# Patient Record
Sex: FEMALE | Race: WHITE | NOT HISPANIC OR LATINO | URBAN - METROPOLITAN AREA
[De-identification: names, ages, dates, MRNs, and addresses within clinical notes are randomized per-mention and may not be internally consistent; named-entity substitution may affect disease eponyms.]

---

## 2017-05-09 ENCOUNTER — IMPORTED ENCOUNTER (OUTPATIENT)
Dept: URBAN - METROPOLITAN AREA CLINIC 50 | Facility: CLINIC | Age: 77
End: 2017-05-09

## 2017-05-16 ENCOUNTER — IMPORTED ENCOUNTER (OUTPATIENT)
Dept: URBAN - METROPOLITAN AREA CLINIC 50 | Facility: CLINIC | Age: 77
End: 2017-05-16

## 2017-11-14 ENCOUNTER — IMPORTED ENCOUNTER (OUTPATIENT)
Dept: URBAN - METROPOLITAN AREA CLINIC 50 | Facility: CLINIC | Age: 77
End: 2017-11-14

## 2018-05-17 ENCOUNTER — IMPORTED ENCOUNTER (OUTPATIENT)
Dept: URBAN - METROPOLITAN AREA CLINIC 50 | Facility: CLINIC | Age: 78
End: 2018-05-17

## 2018-05-17 NOTE — PATIENT DISCUSSION
""""" ""Informed patient that their cataract OS > OD is visually significant and meets the criteria for cataract surgery to increase their vision and decrease their glare symptoms.  Order scans for IOL measurements

## 2019-01-29 ENCOUNTER — IMPORTED ENCOUNTER (OUTPATIENT)
Dept: URBAN - METROPOLITAN AREA CLINIC 50 | Facility: CLINIC | Age: 79
End: 2019-01-29

## 2019-03-12 NOTE — PATIENT DISCUSSION
RETINA IS ATTACHED OU: S/P PRP LASER OD; SEVERE VITREOUS HEMORRHAGE OS; NO HOLES OR TEARS SEEN ON DILATED EXAM TODAY.  RETINAL DETACHMENT SIGNS AND SYMPTOMS REVIEWED

## 2019-03-12 NOTE — PATIENT DISCUSSION
PROLIFERATIVE DIABETIC RETINOPATHY, OU: ALL TX OPTIONS DISCUSSED FOR VITREOUS HEMORRHAGE OS. WILL REQUIRE MEDICAL CLEARANCE FROM DR GLASGOW FOR PPV SURGERY WITH MAC ANESTHESIA. POOR VIEW FOR PRP LASER. PATIENT ELECTED TO START ANTI-VEGF THERAPY TODAY. RETURN AS SCHEDULED.

## 2019-04-09 NOTE — PATIENT DISCUSSION
"""Discussed he qualifies for cataract surgery. Will monitor. He isn't too bothered at this time.  """

## 2019-04-09 NOTE — PATIENT DISCUSSION
"""Discussed. His optic nerve OD looks larger today. Recommend he do HVF/ OCT and pach.  Dr. CONWAY [facility]

## 2019-05-24 NOTE — PATIENT DISCUSSION
PROLIFERATIVE DIABETIC RETINOPATHY, OS:  AVASTIN (1.25MG) INTRAVITREAL INJECTION TODAY. RETURN AS SCHEDULED FOR PRP LASER OS.

## 2019-05-24 NOTE — PATIENT DISCUSSION
RETINA IS ATTACHED OU: S/P PRP LASER OD; VITREOUS HEMORRHAGE IMPROVING OS; NO HOLES OR TEARS SEEN ON DILATED EXAM TODAY.  RETINAL DETACHMENT SIGNS AND SYMPTOMS REVIEWED

## 2019-06-25 NOTE — PATIENT DISCUSSION
New Prescription: Pred Forte (prednisolone acetate): drops,suspension: 1% 1 drop four times a day into affected eye 06-

## 2019-06-25 NOTE — PATIENT DISCUSSION
DIABETIC RETINOPATHY: PT IS BEING FOLLOWED BY DR. Rigo Chaidez. KEEP SCHEDULED APPTS.  HAS BEEN CLEARED FOR PHACO OS

## 2019-06-25 NOTE — PATIENT DISCUSSION
New Prescription: Ocuflox (ofloxacin): drops: 0.3% 1 drop four times a day as directed into affected eye 06-

## 2019-06-25 NOTE — PATIENT DISCUSSION
New Prescription: Acular (ketorolac): drops: 0.5% 1 drop four times a day as directed into affected eye 06-

## 2019-07-11 NOTE — PATIENT DISCUSSION
Continue: Pred Forte (prednisolone acetate): drops,suspension: 1% 1 drop four times a day into affected eye 06-

## 2019-07-23 ENCOUNTER — IMPORTED ENCOUNTER (OUTPATIENT)
Dept: URBAN - METROPOLITAN AREA CLINIC 50 | Facility: CLINIC | Age: 79
End: 2019-07-23

## 2019-07-23 NOTE — PATIENT DISCUSSION
""" to review testing with patient today."" ""Increase Timolol 0.5% both eyes twice a day . """ normal

## 2019-07-24 NOTE — PATIENT DISCUSSION
POST-OP INSTRUCTIONS:  The patient is told to resume normal daily activities. New spectacle prescripton given to patient. Patient to stop Oculfloxacin. Continue Acular QID OS until bottle runs out. Patient to taper prednisolone with a 3,2,1 taper and then discontinue. Will see patient back in 3 months for a DFE. Monitor.

## 2019-08-08 ENCOUNTER — IMPORTED ENCOUNTER (OUTPATIENT)
Dept: URBAN - METROPOLITAN AREA CLINIC 50 | Facility: CLINIC | Age: 79
End: 2019-08-08

## 2019-08-08 NOTE — PATIENT DISCUSSION
"""Reassured patient that intraocular pressures (IOPs) are at target levels and other ocular findings are stable. Continue present management and/or medication(s). Follow up as directed. "" ""Continue Timolol 0.5% both eyes twice a day . """

## 2020-02-06 ENCOUNTER — IMPORTED ENCOUNTER (OUTPATIENT)
Dept: URBAN - METROPOLITAN AREA CLINIC 50 | Facility: CLINIC | Age: 80
End: 2020-02-06

## 2020-08-13 ENCOUNTER — IMPORTED ENCOUNTER (OUTPATIENT)
Dept: URBAN - METROPOLITAN AREA CLINIC 50 | Facility: CLINIC | Age: 80
End: 2020-08-13

## 2020-08-20 ENCOUNTER — IMPORTED ENCOUNTER (OUTPATIENT)
Dept: URBAN - METROPOLITAN AREA CLINIC 50 | Facility: CLINIC | Age: 80
End: 2020-08-20

## 2020-10-21 ENCOUNTER — IMPORTED ENCOUNTER (OUTPATIENT)
Dept: URBAN - METROPOLITAN AREA CLINIC 50 | Facility: CLINIC | Age: 80
End: 2020-10-21

## 2020-11-18 ENCOUNTER — IMPORTED ENCOUNTER (OUTPATIENT)
Dept: URBAN - METROPOLITAN AREA CLINIC 50 | Facility: CLINIC | Age: 80
End: 2020-11-18

## 2020-11-19 ENCOUNTER — IMPORTED ENCOUNTER (OUTPATIENT)
Dept: URBAN - METROPOLITAN AREA CLINIC 50 | Facility: CLINIC | Age: 80
End: 2020-11-19

## 2021-04-16 ASSESSMENT — VISUAL ACUITY
OS_CC: 20/40-2
OS_CC: 20/30-1
OD_CC: 20/25
OS_CC: 20/40-1
OD_CC: 20/20
OS_CC: 20/40
OD_OTHER: 20/50. 20/80.
OS_CC: 20/30
OD_CC: J2@ 14 IN
OS_BAT: 20/40
OD_BAT: 20/50
OD_OTHER: 20/40. 20/50.
OD_CC: J1@ 16 IN
OS_PH: 20/30
OS_CC: 20/30-
OS_BAT: 20/60
OD_CC: 20/30
OD_OTHER: 20/50. 20/60.
OS_PH: 20/40
OD_CC: 20/30+2
OS_CC: 20/30
OS_CC: J1
OS_CC: 20/30-
OS_CC: 20/30-1
OD_CC: J1
OS_OTHER: 20/60. 20/200.
OS_CC: J1
OD_CC: 20/20-1
OD_CC: 20/30
OS_PH: 20/30
OS_OTHER: 20/70. 20/80.
OD_CC: 20/25
OD_CC: 20/25
OS_CC: J2@ 14 IN
OS_CC: 20/30-1
OS_CC: 20/50
OD_BAT: 20/40
OD_CC: J1
OS_OTHER: 20/40. 20/70.
OS_BAT: 20/70
OD_BAT: 20/50
OS_PH: 20/40
OD_CC: 20/25
OS_BAT: 20/50
OS_CC: J1@ 16 IN
OS_PH: 20/30
OD_BAT: 20/50
OS_OTHER: 20/50. 20/80.
OD_CC: 20/25
OD_CC: 20/20-1

## 2021-04-16 ASSESSMENT — PACHYMETRY
OS_CT_UM: 560
OD_CT_UM: 563
OD_CT_UM: 563
OS_CT_UM: 560
OD_CT_UM: 563
OS_CT_UM: 560
OD_CT_UM: 563
OS_CT_UM: 560
OD_CT_UM: 563
OS_CT_UM: 560

## 2021-04-16 ASSESSMENT — TONOMETRY
OD_IOP_MMHG: 20
OS_IOP_MMHG: 17
OD_IOP_MMHG: 18
OS_IOP_MMHG: 21
OD_IOP_MMHG: 25
OS_IOP_MMHG: 18
OS_IOP_MMHG: 19
OS_IOP_MMHG: 20
OS_IOP_MMHG: 20
OS_IOP_MMHG: 17
OD_IOP_MMHG: 20
OD_IOP_MMHG: 22
OD_IOP_MMHG: 20
OD_IOP_MMHG: 20
OD_IOP_MMHG: 26
OS_IOP_MMHG: 21
OD_IOP_MMHG: 19
OS_IOP_MMHG: 20
OD_IOP_MMHG: 20
OD_IOP_MMHG: 21
OD_IOP_MMHG: 19
OD_IOP_MMHG: 18
OS_IOP_MMHG: 25
OS_IOP_MMHG: 21
OS_IOP_MMHG: 26
OS_IOP_MMHG: 16
OD_IOP_MMHG: 24
OS_IOP_MMHG: 27
OD_IOP_MMHG: 23
OD_IOP_MMHG: 23
OS_IOP_MMHG: 22
OD_IOP_MMHG: 18
OS_IOP_MMHG: 17
OS_IOP_MMHG: 23

## 2021-05-05 ENCOUNTER — ROUTINE EXAM (OUTPATIENT)
Dept: URBAN - METROPOLITAN AREA CLINIC 49 | Facility: CLINIC | Age: 81
End: 2021-05-05

## 2021-05-05 DIAGNOSIS — Z01.00: ICD-10-CM

## 2021-05-05 PROCEDURE — 92015 DETERMINE REFRACTIVE STATE: CPT

## 2021-05-05 PROCEDURE — 92014 COMPRE OPH EXAM EST PT 1/>: CPT

## 2021-05-05 RX ORDER — LATANOPROST 50 UG/ML
1 SOLUTION/ DROPS OPHTHALMIC EVERY EVENING
Start: 2021-05-05

## 2021-05-05 ASSESSMENT — VISUAL ACUITY
OD_CC: 20/20-1
OS_PH: 20/30
OU_CC: J1
OS_CC: 20/40-2

## 2021-05-05 ASSESSMENT — TONOMETRY
OD_IOP_MMHG: 21
OD_IOP_MMHG: 20
OS_IOP_MMHG: 20
OS_IOP_MMHG: 19

## 2021-05-05 NOTE — PATIENT DISCUSSION
Will start additional drop therapy to lower IOP OU. Target IOP will be 15 OU. Schedule 1 month IOP check.

## 2021-05-10 NOTE — PATIENT DISCUSSION
DRY EYES : Discussed with patient the importance of keeping the eye moist and the symptoms associated with dry eyes including blurry vision, tearing, burning, and enrique sensation. Advised patient to minimize use of any fans blowing directly on the face. Advised patient to continue with artificial tears 2-3 times daily.

## 2021-05-10 NOTE — PATIENT DISCUSSION
Nevus: Will keep eye to see if any new changes in size or color. Call office if any changes in vision.

## 2021-06-02 ENCOUNTER — 1 MONTH FOLLOW-UP (OUTPATIENT)
Dept: URBAN - METROPOLITAN AREA CLINIC 49 | Facility: CLINIC | Age: 81
End: 2021-06-02

## 2021-06-02 DIAGNOSIS — H40.1131: ICD-10-CM

## 2021-06-02 PROCEDURE — 92012 INTRM OPH EXAM EST PATIENT: CPT

## 2021-06-02 ASSESSMENT — VISUAL ACUITY
OS_CC: 20/30-1
OD_CC: 20/20-2

## 2021-06-02 ASSESSMENT — TONOMETRY
OD_IOP_MMHG: 18
OD_IOP_MMHG: 17
OS_IOP_MMHG: 17
OS_IOP_MMHG: 16

## 2021-06-02 NOTE — PATIENT DISCUSSION
iop at goal today,  recommend patient continue timolol twice a day in both eyes and latanoprost at bed time in both eyes.

## 2021-11-02 ENCOUNTER — 5 MONTH FOLLOW-UP (OUTPATIENT)
Dept: URBAN - METROPOLITAN AREA CLINIC 53 | Facility: CLINIC | Age: 81
End: 2021-11-02

## 2021-11-02 DIAGNOSIS — H40.013: ICD-10-CM

## 2021-11-02 DIAGNOSIS — H40.1131: ICD-10-CM

## 2021-11-02 PROCEDURE — 92014 COMPRE OPH EXAM EST PT 1/>: CPT

## 2021-11-02 PROCEDURE — 92083 EXTENDED VISUAL FIELD XM: CPT

## 2021-11-02 PROCEDURE — 92133 CPTRZD OPH DX IMG PST SGM ON: CPT

## 2021-11-02 ASSESSMENT — TONOMETRY
OD_IOP_MMHG: 17
OS_IOP_MMHG: 16
OD_IOP_MMHG: 18
OS_IOP_MMHG: 17

## 2021-11-02 ASSESSMENT — VISUAL ACUITY
OS_CC: J1+
OS_CC: 20/25
OD_CC: 20/20
OD_CC: J1+

## 2021-11-02 NOTE — PATIENT DISCUSSION
iop at goal today,  hvf/oct rnfl sdone today and reviewed with patient.  recommend patient continue timolol twice a day in both eyes and latanoprost at bed time in both eyes.

## 2021-11-02 NOTE — PATIENT DISCUSSION
Recommend artificial tears as needed.  Patient is only doing artificial tears every once in a while.  discussed with patient that she needs to increase artifial tears. recommend patient start refresh cayla 3, 3 times a day in both eyes and gel tears at bedtime.

## 2021-12-09 ENCOUNTER — FOLLOW UP (OUTPATIENT)
Dept: URBAN - METROPOLITAN AREA CLINIC 49 | Facility: CLINIC | Age: 81
End: 2021-12-09

## 2021-12-09 DIAGNOSIS — H40.1131: ICD-10-CM

## 2021-12-09 DIAGNOSIS — H40.013: ICD-10-CM

## 2021-12-09 DIAGNOSIS — H04.123: ICD-10-CM

## 2021-12-09 DIAGNOSIS — H25.813: ICD-10-CM

## 2021-12-09 PROCEDURE — 92012 INTRM OPH EXAM EST PATIENT: CPT

## 2021-12-09 ASSESSMENT — TONOMETRY
OS_IOP_MMHG: 20
OD_IOP_MMHG: 20
OS_IOP_MMHG: 19
OD_IOP_MMHG: 19

## 2021-12-09 ASSESSMENT — VISUAL ACUITY
OS_CC: 20/30
OD_CC: 20/25

## 2021-12-09 NOTE — PATIENT DISCUSSION
Will stop Gel AT's due to not tolerating it well. Continue PF AT's OU TID. Recommend switching brands and trying [place] PF AT's.  Start Tyrvaya nasal spray BID. (sample given) Advised of sneezing. Follow up in 2 weeks.

## 2021-12-09 NOTE — PATIENT DISCUSSION
Patent states many years ago she had a procedure that involved needles in her eyes. Possible corneal recurrent treatment.

## 2021-12-23 ENCOUNTER — FOLLOW UP (OUTPATIENT)
Dept: URBAN - METROPOLITAN AREA CLINIC 49 | Facility: CLINIC | Age: 81
End: 2021-12-23

## 2021-12-23 DIAGNOSIS — H40.1131: ICD-10-CM

## 2021-12-23 DIAGNOSIS — H04.123: ICD-10-CM

## 2021-12-23 DIAGNOSIS — H40.013: ICD-10-CM

## 2021-12-23 PROCEDURE — 92012 INTRM OPH EXAM EST PATIENT: CPT

## 2021-12-23 ASSESSMENT — VISUAL ACUITY
OD_CC: 20/25-2
OS_PH: 20/30
OS_CC: 20/50+2

## 2021-12-23 ASSESSMENT — TONOMETRY
OS_IOP_MMHG: 16
OD_IOP_MMHG: 16
OS_IOP_MMHG: 17
OD_IOP_MMHG: 17

## 2022-05-03 ENCOUNTER — FOLLOW UP (OUTPATIENT)
Dept: URBAN - METROPOLITAN AREA CLINIC 53 | Facility: CLINIC | Age: 82
End: 2022-05-03

## 2022-05-03 DIAGNOSIS — H40.1131: ICD-10-CM

## 2022-05-03 DIAGNOSIS — H04.123: ICD-10-CM

## 2022-05-03 PROCEDURE — 92012 INTRM OPH EXAM EST PATIENT: CPT

## 2022-05-03 ASSESSMENT — TONOMETRY
OD_IOP_MMHG: 18
OS_IOP_MMHG: 17
OS_IOP_MMHG: 16
OD_IOP_MMHG: 17

## 2022-05-03 ASSESSMENT — VISUAL ACUITY
OS_CC: 20/50-1
OD_CC: 20/25-1
OS_PH: 20/40-2

## 2022-05-04 NOTE — PATIENT DISCUSSION
-- DO NOT REPLY / DO NOT REPLY ALL --  -- Message is from the [facility]--    General Patient Message      Reason for Call: Nurse Marcy is calling on behalf of [employer] For Dr. Eduin Petersen regarding patient who health may be at risk due to continual  medication non adherence . Ref#[id number]. Please call Marcy.    Caller Information       Type Contact Phone    05/04/2022 01:50 PM CDT Phone (Incoming) Marcy [phone]     nurse calling zanda          Alternative phone number: none    Turnaround time given to caller:   \"This message will be sent to [state Provider's name]. The clinical team will fulfill your request as soon as they review your message.\"     Posterior Capsular Fibrosis Counseling: The diagnosis of posterior capsular fibrosis (PCF), also referred to as a secondary cataract or posterior capsular opacification (PCO), was discussed with the patient. The patient understands that their symptoms and limitations are likely related to this condition.

## 2022-10-18 ENCOUNTER — ESTABLISHED PATIENT (OUTPATIENT)
Dept: URBAN - METROPOLITAN AREA CLINIC 53 | Facility: CLINIC | Age: 82
End: 2022-10-18

## 2022-10-18 DIAGNOSIS — H40.013: ICD-10-CM

## 2022-10-18 DIAGNOSIS — H04.123: ICD-10-CM

## 2022-10-18 DIAGNOSIS — H25.813: ICD-10-CM

## 2022-10-18 DIAGNOSIS — H40.1131: ICD-10-CM

## 2022-10-18 PROCEDURE — 92014 COMPRE OPH EXAM EST PT 1/>: CPT

## 2022-10-18 PROCEDURE — 92083 EXTENDED VISUAL FIELD XM: CPT

## 2022-10-18 PROCEDURE — 92133 CPTRZD OPH DX IMG PST SGM ON: CPT

## 2022-10-18 ASSESSMENT — VISUAL ACUITY
OD_CC: 20/20
OS_CC: 20/40
OS_PH: 20/30

## 2022-10-18 ASSESSMENT — TONOMETRY
OS_IOP_MMHG: 21
OD_IOP_MMHG: 22
OS_IOP_MMHG: 22
OD_IOP_MMHG: 21

## 2023-01-03 NOTE — PATIENT DISCUSSION
Proliferative Diabetic Retinopathy Counseling: The patient was informed that ongoing control of blood glucose, blood pressure and lipid levels is necessary to minimize future retinal damage due to diabetes. Drysol Pregnancy And Lactation Text: This medication is considered safe during pregnancy and breast feeding.

## 2023-04-18 ENCOUNTER — FOLLOW UP (OUTPATIENT)
Dept: URBAN - METROPOLITAN AREA CLINIC 53 | Facility: CLINIC | Age: 83
End: 2023-04-18

## 2023-04-18 DIAGNOSIS — H25.813: ICD-10-CM

## 2023-04-18 DIAGNOSIS — H40.1131: ICD-10-CM

## 2023-04-18 DIAGNOSIS — H04.123: ICD-10-CM

## 2023-04-18 PROCEDURE — 92014 COMPRE OPH EXAM EST PT 1/>: CPT

## 2023-04-18 ASSESSMENT — VISUAL ACUITY
OD_GLARE: 20/25
OS_CC: 20/50-2
OD_GLARE: 20/25
OS_PH: 20/30
OS_GLARE: 20/60
OS_GLARE: 20/80
OD_CC: 20/25
OU_CC: 20/25

## 2023-04-18 ASSESSMENT — TONOMETRY
OD_IOP_MMHG: 18
OS_IOP_MMHG: 17
OS_IOP_MMHG: 16
OD_IOP_MMHG: 17

## 2023-05-02 ENCOUNTER — PRE-OP/H&P (OUTPATIENT)
Dept: URBAN - METROPOLITAN AREA CLINIC 49 | Facility: CLINIC | Age: 83
End: 2023-05-02

## 2023-05-02 DIAGNOSIS — H25.813: ICD-10-CM

## 2023-05-02 DIAGNOSIS — H35.363: ICD-10-CM

## 2023-05-02 DIAGNOSIS — H40.1131: ICD-10-CM

## 2023-05-02 PROCEDURE — 92134 CPTRZ OPH DX IMG PST SGM RTA: CPT

## 2023-05-02 PROCEDURE — PREOP PRE OP VISIT

## 2023-05-02 PROCEDURE — 92136 OPHTHALMIC BIOMETRY: CPT

## 2023-05-02 ASSESSMENT — VISUAL ACUITY
OS_CC: 20/50-2
OD_GLARE: 20/50
OS_GLARE: <400
OD_GLARE: 20/400
OD_CC: 20/30

## 2023-05-02 ASSESSMENT — KERATOMETRY
OD_K2POWER_DIOPTERS: 41.12
OS_AXISANGLE_DEGREES: 175
OS_K1POWER_DIOPTERS: 42.75
OS_AXISANGLE2_DEGREES: 85
OS_K2POWER_DIOPTERS: 41.37
OD_K1POWER_DIOPTERS: 42.25
OD_AXISANGLE2_DEGREES: 90
OD_AXISANGLE_DEGREES: 180

## 2023-05-02 ASSESSMENT — TONOMETRY
OS_IOP_MMHG: 18
OS_IOP_MMHG: 17
OD_IOP_MMHG: 18
OD_IOP_MMHG: 17

## 2023-05-12 ENCOUNTER — POST-OP (OUTPATIENT)
Dept: URBAN - METROPOLITAN AREA CLINIC 49 | Facility: CLINIC | Age: 83
End: 2023-05-12

## 2023-05-12 ENCOUNTER — SURGERY/PROCEDURE (OUTPATIENT)
Dept: URBAN - METROPOLITAN AREA SURGERY 16 | Facility: SURGERY | Age: 83
End: 2023-05-12

## 2023-05-12 DIAGNOSIS — H25.812: ICD-10-CM

## 2023-05-12 DIAGNOSIS — Z98.42: ICD-10-CM

## 2023-05-12 DIAGNOSIS — H40.1121: ICD-10-CM

## 2023-05-12 DIAGNOSIS — Z96.1: ICD-10-CM

## 2023-05-12 PROCEDURE — 66174 TRLUML DIL AQ O/F CAN W/O ST: CPT

## 2023-05-12 PROCEDURE — 99024 POSTOP FOLLOW-UP VISIT: CPT

## 2023-05-12 PROCEDURE — 66991 XCAPSL CTRC RMVL INSJ 1+: CPT

## 2023-05-12 ASSESSMENT — KERATOMETRY
OS_K1POWER_DIOPTERS: 42.75
OS_K1POWER_DIOPTERS: 42.75
OD_AXISANGLE2_DEGREES: 90
OS_K2POWER_DIOPTERS: 41.37
OD_K2POWER_DIOPTERS: 41.12
OS_AXISANGLE_DEGREES: 175
OS_AXISANGLE_DEGREES: 175
OS_AXISANGLE2_DEGREES: 85
OS_AXISANGLE2_DEGREES: 85
OD_AXISANGLE_DEGREES: 180
OS_K2POWER_DIOPTERS: 41.37
OD_K2POWER_DIOPTERS: 41.12
OD_AXISANGLE_DEGREES: 180
OD_K1POWER_DIOPTERS: 42.25
OD_K1POWER_DIOPTERS: 42.25
OD_AXISANGLE2_DEGREES: 90

## 2023-05-12 ASSESSMENT — VISUAL ACUITY: OS_SC: 20/200

## 2023-05-12 ASSESSMENT — TONOMETRY: OS_IOP_MMHG: 16

## 2023-05-16 ENCOUNTER — POST OP/EVAL OF SECOND EYE (OUTPATIENT)
Dept: URBAN - METROPOLITAN AREA CLINIC 49 | Facility: CLINIC | Age: 83
End: 2023-05-16

## 2023-05-16 DIAGNOSIS — Z98.42: ICD-10-CM

## 2023-05-16 DIAGNOSIS — H25.811: ICD-10-CM

## 2023-05-16 PROCEDURE — 99213 OFFICE O/P EST LOW 20 MIN: CPT

## 2023-05-16 PROCEDURE — 92136 - 2N OPHTHALMIC BIOMETRY BY PARTIAL COHERENCE INTERFEROMETRY WITH INTRAOCULAR LENS POWER CALCULATION

## 2023-05-16 ASSESSMENT — VISUAL ACUITY
OD_CC: 20/25
OD_GLARE: 20/400
OD_GLARE: >20/400
OS_SC: 20/150

## 2023-05-16 ASSESSMENT — TONOMETRY
OD_IOP_MMHG: 16
OS_IOP_MMHG: 16
OS_IOP_MMHG: 17
OD_IOP_MMHG: 15

## 2023-05-16 ASSESSMENT — KERATOMETRY
OD_AXISANGLE2_DEGREES: 90
OS_K2POWER_DIOPTERS: 41.37
OD_K1POWER_DIOPTERS: 42.25
OD_AXISANGLE_DEGREES: 180
OS_K1POWER_DIOPTERS: 42.75
OS_AXISANGLE_DEGREES: 175
OD_K2POWER_DIOPTERS: 41.12
OS_AXISANGLE2_DEGREES: 85

## 2023-05-25 ENCOUNTER — SURGERY/PROCEDURE (OUTPATIENT)
Dept: URBAN - METROPOLITAN AREA SURGERY 16 | Facility: SURGERY | Age: 83
End: 2023-05-25

## 2023-05-25 ENCOUNTER — POST-OP (OUTPATIENT)
Dept: URBAN - METROPOLITAN AREA CLINIC 49 | Facility: CLINIC | Age: 83
End: 2023-05-25

## 2023-05-25 DIAGNOSIS — Z98.41: ICD-10-CM

## 2023-05-25 DIAGNOSIS — H25.811: ICD-10-CM

## 2023-05-25 DIAGNOSIS — H40.1111: ICD-10-CM

## 2023-05-25 PROCEDURE — 66991 XCAPSL CTRC RMVL INSJ 1+: CPT

## 2023-05-25 PROCEDURE — 66174 TRLUML DIL AQ O/F CAN W/O ST: CPT

## 2023-05-25 ASSESSMENT — KERATOMETRY
OD_AXISANGLE2_DEGREES: 90
OS_K2POWER_DIOPTERS: 41.37
OD_K1POWER_DIOPTERS: 42.25
OD_AXISANGLE_DEGREES: 180
OS_AXISANGLE_DEGREES: 175
OS_AXISANGLE2_DEGREES: 85
OS_K1POWER_DIOPTERS: 42.75
OD_K2POWER_DIOPTERS: 41.12

## 2023-05-25 ASSESSMENT — TONOMETRY: OD_IOP_MMHG: 26

## 2023-05-25 ASSESSMENT — VISUAL ACUITY: OD_SC: 20/50-2

## 2023-06-02 ENCOUNTER — POST-OP (OUTPATIENT)
Dept: URBAN - METROPOLITAN AREA CLINIC 49 | Facility: CLINIC | Age: 83
End: 2023-06-02

## 2023-06-02 DIAGNOSIS — H40.1131: ICD-10-CM

## 2023-06-02 DIAGNOSIS — Z98.41: ICD-10-CM

## 2023-06-02 DIAGNOSIS — Z96.1: ICD-10-CM

## 2023-06-02 PROCEDURE — 99024 POSTOP FOLLOW-UP VISIT: CPT

## 2023-06-02 RX ORDER — BRIMONIDINE TARTRATE 2 MG/MG: 1 SOLUTION/ DROPS OPHTHALMIC TWICE A DAY

## 2023-06-02 ASSESSMENT — VISUAL ACUITY
OD_SC: 20/30
OS_PH: 20/25
OD_PH: 20/20
OS_SC: 20/50+2

## 2023-06-02 ASSESSMENT — TONOMETRY
OD_IOP_MMHG: 24
OS_IOP_MMHG: 14
OD_IOP_MMHG: 25
OS_IOP_MMHG: 15

## 2023-06-02 ASSESSMENT — KERATOMETRY
OD_K1POWER_DIOPTERS: 42.25
OS_AXISANGLE2_DEGREES: 174
OD_AXISANGLE2_DEGREES: 180
OS_K1POWER_DIOPTERS: 41.75
OS_AXISANGLE_DEGREES: 84
OD_K2POWER_DIOPTERS: 41.12
OD_K1POWER_DIOPTERS: 41.75
OD_AXISANGLE_DEGREES: 180
OD_AXISANGLE_DEGREES: 90
OS_AXISANGLE_DEGREES: 175
OS_K2POWER_DIOPTERS: 41.37
OS_K1POWER_DIOPTERS: 42.75
OS_K2POWER_DIOPTERS: 43.50
OD_K2POWER_DIOPTERS: 43.00
OS_AXISANGLE2_DEGREES: 85
OD_AXISANGLE2_DEGREES: 90

## 2023-06-20 ENCOUNTER — POST-OP (OUTPATIENT)
Dept: URBAN - METROPOLITAN AREA CLINIC 49 | Facility: CLINIC | Age: 83
End: 2023-06-20

## 2023-06-20 DIAGNOSIS — Z98.41: ICD-10-CM

## 2023-06-20 DIAGNOSIS — H35.363: ICD-10-CM

## 2023-06-20 DIAGNOSIS — Z98.42: ICD-10-CM

## 2023-06-20 DIAGNOSIS — H40.1131: ICD-10-CM

## 2023-06-20 PROCEDURE — 92015GRNC REFRACTION GLASSES RECHECK NO CHARGE

## 2023-06-20 PROCEDURE — 92134 CPTRZ OPH DX IMG PST SGM RTA: CPT

## 2023-06-20 PROCEDURE — 99024 POSTOP FOLLOW-UP VISIT: CPT

## 2023-06-20 ASSESSMENT — KERATOMETRY
OD_AXISANGLE_DEGREES: 87
OD_K2POWER_DIOPTERS: 41.12
OD_AXISANGLE_DEGREES: 180
OD_AXISANGLE2_DEGREES: 177
OD_K1POWER_DIOPTERS: 41.75
OS_K1POWER_DIOPTERS: 41.50
OS_AXISANGLE_DEGREES: 85
OD_K2POWER_DIOPTERS: 43.00
OD_K1POWER_DIOPTERS: 42.25
OS_AXISANGLE_DEGREES: 175
OD_AXISANGLE_DEGREES: 90
OD_AXISANGLE2_DEGREES: 180
OS_AXISANGLE_DEGREES: 84
OS_K2POWER_DIOPTERS: 43.00
OS_AXISANGLE2_DEGREES: 175
OS_K2POWER_DIOPTERS: 43.50
OD_AXISANGLE2_DEGREES: 90
OS_K1POWER_DIOPTERS: 41.75
OS_AXISANGLE2_DEGREES: 85
OD_K1POWER_DIOPTERS: 41.50
OD_K2POWER_DIOPTERS: 42.75
OS_K1POWER_DIOPTERS: 42.75
OS_AXISANGLE2_DEGREES: 174
OS_K2POWER_DIOPTERS: 41.37

## 2023-06-20 ASSESSMENT — VISUAL ACUITY
OS_SC: 20/80
OD_SC: 20/40
OD_PH: 20/20
OS_PH: 20/30

## 2023-06-20 ASSESSMENT — TONOMETRY
OD_IOP_MMHG: 14
OS_IOP_MMHG: 14
OD_IOP_MMHG: 15
OS_IOP_MMHG: 13